# Patient Record
(demographics unavailable — no encounter records)

---

## 2024-10-23 NOTE — PHYSICAL EXAM
[Chaperone Present] : A chaperone was present in the examining room during all aspects of the physical examination [26062] : A chaperone was present during the pelvic exam. [Appropriately responsive] : appropriately responsive [Alert] : alert [No Acute Distress] : no acute distress [Soft] : soft [Non-tender] : non-tender [Non-distended] : non-distended [Oriented x3] : oriented x3 [Examination Of The Breasts] : a normal appearance [No Discharge] : no discharge [No Masses] : no breast masses were palpable [Labia Majora] : normal [Labia Minora] : normal [No Bleeding] : There was no active vaginal bleeding [Normal] : normal [Uterine Adnexae] : normal

## 2024-10-23 NOTE — PHYSICAL EXAM
[Chaperone Present] : A chaperone was present in the examining room during all aspects of the physical examination [88259] : A chaperone was present during the pelvic exam. [Appropriately responsive] : appropriately responsive [Alert] : alert [No Acute Distress] : no acute distress [Soft] : soft [Non-tender] : non-tender [Non-distended] : non-distended [Oriented x3] : oriented x3 [Examination Of The Breasts] : a normal appearance [No Discharge] : no discharge [No Masses] : no breast masses were palpable [Labia Majora] : normal [Labia Minora] : normal [No Bleeding] : There was no active vaginal bleeding [Normal] : normal [Uterine Adnexae] : normal

## 2024-10-31 NOTE — PLAN
[FreeTextEntry1] : We discussed my recommendation for the Gardasil vaccination series to prevent against certain cancers related to the HPV virus.   Pap done today.  Prescription for the birth control ring were electronically sent to the pharmacy. She has no contraindications to birth control ring use. Instructions on ring use, precautions, and side effects were discussed in detail. She is aware that the birth control ring is not perfect for preventing pregnancy, and therefore she needs condoms for back up. She was also made aware that the birth control ring does not protect her against sexual transmitted diseases, and she still requires condom use. Questions answered.  Self-breast exam reviewed.  STI blood work collected today.   She will follow up annually and as needed.

## 2024-10-31 NOTE — END OF VISIT
[FreeTextEntry3] : I, Garland Robbins solely acted as scribe for Dr. Kadie العلي on 10/23/2024  All medical entries made by the Scribe were at my, Dr. Allen, direction and personally dictated by me on 10/23/2024 . I have reviewed the chart and agree that the record accurately reflects my personal performance of the history, physical exam, assessment and plan. I have also personally directed, reviewed, and agreed with the chart.

## 2024-10-31 NOTE — HISTORY OF PRESENT ILLNESS
[Oral Contraceptive] : uses oral contraception pills [Y] : Patient is sexually active [Menarche Age: ____] : age at menarche was [unfilled] [No] : Patient does not have concerns regarding sex [Currently Active] : currently active [Men] : men [N] : Patient does not use contraception [PapSmeardate] : NEVER [GonorrheaDate] : 10/11/23 [TextBox_63] : NEG [ChlamydiaDate] : 10/11/23 [TextBox_68] : NEG [LMPDate] : 10/07/24 [PGHxTotal] : 0 [FreeTextEntry1] : 10/07/24

## 2024-12-18 NOTE — HEALTH RISK ASSESSMENT
[Good] : ~his/her~  mood as  good [Yes] : Yes [2 - 4 times a month (2 pts)] : 2-4 times a month (2 points) [1 or 2 (0 pts)] : 1 or 2 (0 points) [Never (0 pts)] : Never (0 points) [No] : In the past 12 months have you used drugs other than those required for medical reasons? No [0] : 2) Feeling down, depressed, or hopeless: Not at all (0) [PHQ-2 Negative - No further assessment needed] : PHQ-2 Negative - No further assessment needed [Audit-CScore] : 2  [de-identified] : needs improvement [de-identified] : needs improvement [[id number]] : 0 [Never] : Never [Patient reported PAP Smear was normal] : Patient reported PAP Smear was normal [HIV test declined] : HIV test declined [Hepatitis C test declined] : Hepatitis C test declined [With Family] : lives with family [Employed] : employed [Student] : student [Single] : single [Sexually Active] : not sexually active [High Risk Behavior] : no high risk behavior [Feels Safe at Home] : Feels safe at home [Fully functional (bathing, dressing, toileting, transferring, walking, feeding)] : Fully functional (bathing, dressing, toileting, transferring, walking, feeding) [Fully functional (using the telephone, shopping, preparing meals, housekeeping, doing laundry, using] : Fully functional and needs no help or supervision to perform IADLs (using the telephone, shopping, preparing meals, housekeeping, doing laundry, using transportation, managing medications and managing finances) [Reports changes in hearing] : Reports no changes in hearing [Reports changes in vision] : Reports no changes in vision [Reports changes in dental health] : Reports no changes in dental health [PapSmearDate] : 10/24 [PapSmearComments] : NILM

## 2024-12-18 NOTE — HISTORY OF PRESENT ILLNESS
[FreeTextEntry1] :  REBECA is a 22 year-old female here for a CPE [de-identified] : 23 yo female presents for annual physical. reports concern for cough, congestion and sore throat. She says cough can be productive with white/green sputum. Symptoms began on Monday. In addition, she notes concern for easy bruising on arms. No gingival bleeding/hemarthrosis/epistaxis. Denies fever, chills, cp, palpitations, sob, nvcd.

## 2024-12-18 NOTE — ASSESSMENT
[FreeTextEntry1] : Annual physical: f/u routine labwork Weight loss: notes improved diet, went to gym for 3 months, will ct monitor BMI 28: Recommend low fat diet, wt loss, exercise, and DASH diet. Anxiety: improved, recommend exercise, yoga, meditation Easy bruising: f/u CBC/PT/PTT/INR ordered Acute URI; recommend supportive care, start tylenol prn for fever/pain, recommend increased hydration, call EMS if symptoms worsen or develop sob. Recommend hand hygiene, cover mouth when coughing, wash hands frequently, f/u covid19/viral panel ordered, start azithromycin 250 mg po as prescribed, poct rapid strep negative Low HDL/hypertriglyceridemia: Recommend low fat diet, wt loss, exercise, and DASH diet, f/u lipid panel ordered PCOS: Recommend weight-loss strategies using calorie-restricted diets combined with exercise, f/u GYN Hematuria: f/u repeat UA/UCx ordered RTC 2 wks

## 2024-12-18 NOTE — REVIEW OF SYSTEMS
[Nasal Discharge] : nasal discharge [Sore Throat] : sore throat [Shortness Of Breath] : no shortness of breath [Wheezing] : no wheezing [Cough] : cough [Dyspnea on Exertion] : no dyspnea on exertion [Easy Bruising] : easy bruising [Negative] : Psychiatric

## 2024-12-18 NOTE — PHYSICAL EXAM
[Normal] : affect was normal and insight and judgment were intact [de-identified] : mild pharyngeal erythema, no exudates